# Patient Record
Sex: MALE | Race: WHITE
[De-identification: names, ages, dates, MRNs, and addresses within clinical notes are randomized per-mention and may not be internally consistent; named-entity substitution may affect disease eponyms.]

---

## 2019-02-12 ENCOUNTER — HOSPITAL ENCOUNTER (EMERGENCY)
Dept: HOSPITAL 41 - JD.ED | Age: 64
Discharge: HOME | End: 2019-02-12
Payer: COMMERCIAL

## 2019-02-12 DIAGNOSIS — R07.89: Primary | ICD-10-CM

## 2019-02-12 NOTE — EDM.PDOC
ED HPI GENERAL MEDICAL PROBLEM





- General


Chief Complaint: Chest Pain


Stated Complaint: SENT BY [facility]


Time Seen by Provider: 02/12/19 15:00


Source of Information: Reports: Patient, RN Notes Reviewed





- History of Present Illness


INITIAL COMMENTS - FREE TEXT/NARRATIVE: 





63 year old male had onset of L lateral chest discomfort about 5 days ago, a 

mild nagging ache that has not gone away.  No central chest heaviness or 

tightness.  No radiation of pain to L shoulder or arm.  He has not been short 

of breath, lightheaded or dizzy.  No personal hx of Htn, diabetes or CAD.  He 

does not smoke and denies family hx CAD as well.  


  ** Left Upper Chest


Pain Score (Numeric/FACES): 2





- Related Data


 Allergies











Allergy/AdvReac Type Severity Reaction Status Date / Time


 


No Known Allergies Allergy   Verified 02/12/19 14:59














Past Medical History


HEENT History: Reports: Impaired Vision


Other HEENT History: wears corrective lenses


Genitourinary History: Reports: Retention, Urinary





Social & Family History





- Tobacco Use


Smoking Status *Q: Never Smoker





- Caffeine Use


Caffeine Use: Reports: Coffee





- Recreational Drug Use


Recreational Drug Use: No





ED ROS GENERAL





- Review of Systems


Review Of Systems: See Below


Constitutional: Denies: Fever, Chills, Diaphoresis


HEENT: Denies: Throat Pain


Respiratory: Denies: Shortness of Breath, Pleuritic Chest Pain


Cardiovascular: Reports: Chest Pain.  Denies: Lightheadedness, Palpitations, 

Syncope


GI/Abdominal: Denies: Abdominal Pain, Nausea, Vomiting


Musculoskeletal: Denies: Shoulder Pain, Arm Pain, Back Pain


Skin: Reports: No Symptoms


Neurological: Denies: Dizziness, Numbness, Tingling, Trouble Speaking, 

Difficulty Walking, Weakness





ED EXAM, GENERAL





- Physical Exam


Exam: See Below


General Appearance: Alert, No Apparent Distress


Eye Exam: Bilateral Eye: PERRL


Nose: Normal Inspection


Throat/Mouth: Normal Inspection, Normal Oropharynx


Head: Atraumatic.  No: Facial Swelling


Neck: Supple, Full Range of Motion


Respiratory/Chest: No Respiratory Distress, Lungs Clear, Normal Breath Sounds, 

Chest Non-Tender.  No: Rhonchi, Wheezing


Cardiovascular: Regular Rate, Rhythm


GI/Abdominal: Soft, Non-Tender


Back Exam: No: CVA Tenderness (L), CVA Tenderness (R)


Extremities: Normal Inspection.  No: Pedal Edema, Leg Pain, Increased Warmth, 

Redness


Neurological: Alert, Oriented, No Motor/Sensory Deficits


Skin Exam: Warm, Dry, Normal Color





EKG INTERPRETATION


EKG Date: 02/13/19


Rhythm: Other (sinus bradycardia)


Axis: Normal


P-Wave: Present


QRS: Normal


ST-T: Normal


QT: Normal





Course





- Vital Signs


Last Recorded V/S: 


 Last Vital Signs











Temp  97.7 F   02/12/19 18:00


 


Pulse  52 L  02/12/19 18:00


 


Resp  20   02/12/19 18:00


 


BP  122/76   02/12/19 18:00


 


Pulse Ox  96   02/12/19 18:00














- Orders/Labs/Meds


Orders: 


 Active Orders 24 hr











 Category Date Time Status


 


 EKG 12 Lead [EKG Documentation Completion] [] STAT Care  02/12/19 15:15 

Active


 


 Peripheral IV Care [RC] .AS DIRECTED Care  02/12/19 15:15 Active


 


 Peripheral IV Insertion Adult [OM.PC] Stat Oth  02/12/19 15:15 Ordered











Labs: 


 Laboratory Tests











  02/12/19 02/12/19 02/12/19 Range/Units





  15:35 15:35 15:35 


 


WBC  5.20    (4.23-9.07)  K/mm3


 


RBC  4.77    (4.63-6.08)  M/mm3


 


Hgb  14.6    (13.7-17.5)  gm/L


 


Hct  43.1    (40.1-51.0)  %


 


MCV  90.4    (79.0-92.2)  fl


 


MCH  30.6    (25.7-32.2)  pg


 


MCHC  33.9    (32.2-35.5)  g/dl


 


RDW Std Deviation  42.9    (35.1-43.9)  fL


 


Plt Count  225    (163-337)  K/mm3


 


MPV  8.9 L    (9.4-12.3)  fl


 


Neut % (Auto)  45.8    (34.0-67.9)  %


 


Lymph % (Auto)  44.4    (21.8-53.1)  %


 


Mono % (Auto)  8.3    (5.3-12.2)  %


 


Eos % (Auto)  1.3    (0.8-7.0)  


 


Baso % (Auto)  0.2    (0.1-1.2)  %


 


Neut # (Auto)  2.38    (1.78-5.38)  K/mm3


 


Lymph # (Auto)  2.31    (1.32-3.57)  K/mm3


 


Mono # (Auto)  0.43    (0.30-0.82)  K/mm3


 


Eos # (Auto)  0.07    (0.04-0.54)  K/mm3


 


Baso # (Auto)  0.01    (0.01-0.08)  K/mm3


 


D-Dimer, Quantitative    0.59 H  (0.19-0.50)  mg/L


 


Sodium   142   (136-145)  mEq/L


 


Potassium   4.1   (3.5-5.1)  mEq/L


 


Chloride   104   ()  mEq/L


 


Carbon Dioxide   29   (21-32)  mEq/L


 


Anion Gap   13.1   (5-15)  


 


BUN   20 H   (7-18)  mg/dL


 


Creatinine   1.1   (0.7-1.3)  mg/dL


 


Est Cr Clr Drug Dosing   64.26   mL/min


 


Estimated GFR (MDRD)   > 60   (>60)  mL/min


 


BUN/Creatinine Ratio   18.2 H   (14-18)  


 


Glucose   112   ()  mg/dL


 


Calcium   9.2   (8.5-10.1)  mg/dL


 


Total Bilirubin   0.3   (0.2-1.0)  mg/dL


 


AST   21   (15-37)  U/L


 


ALT   37   (16-63)  U/L


 


Alkaline Phosphatase   91   ()  U/L


 


Troponin I   < 0.017   (0.00-0.056)  ng/mL


 


Total Protein   7.7   (6.4-8.2)  g/dl


 


Albumin   4.1   (3.4-5.0)  g/dl


 


Globulin   3.6   gm/dL


 


Albumin/Globulin Ratio   1.1   (1-2)  














  02/12/19 Range/Units





  17:15 


 


WBC   (4.23-9.07)  K/mm3


 


RBC   (4.63-6.08)  M/mm3


 


Hgb   (13.7-17.5)  gm/L


 


Hct   (40.1-51.0)  %


 


MCV   (79.0-92.2)  fl


 


MCH   (25.7-32.2)  pg


 


MCHC   (32.2-35.5)  g/dl


 


RDW Std Deviation   (35.1-43.9)  fL


 


Plt Count   (163-337)  K/mm3


 


MPV   (9.4-12.3)  fl


 


Neut % (Auto)   (34.0-67.9)  %


 


Lymph % (Auto)   (21.8-53.1)  %


 


Mono % (Auto)   (5.3-12.2)  %


 


Eos % (Auto)   (0.8-7.0)  


 


Baso % (Auto)   (0.1-1.2)  %


 


Neut # (Auto)   (1.78-5.38)  K/mm3


 


Lymph # (Auto)   (1.32-3.57)  K/mm3


 


Mono # (Auto)   (0.30-0.82)  K/mm3


 


Eos # (Auto)   (0.04-0.54)  K/mm3


 


Baso # (Auto)   (0.01-0.08)  K/mm3


 


D-Dimer, Quantitative   (0.19-0.50)  mg/L


 


Sodium   (136-145)  mEq/L


 


Potassium   (3.5-5.1)  mEq/L


 


Chloride   ()  mEq/L


 


Carbon Dioxide   (21-32)  mEq/L


 


Anion Gap   (5-15)  


 


BUN   (7-18)  mg/dL


 


Creatinine   (0.7-1.3)  mg/dL


 


Est Cr Clr Drug Dosing   mL/min


 


Estimated GFR (MDRD)   (>60)  mL/min


 


BUN/Creatinine Ratio   (14-18)  


 


Glucose   ()  mg/dL


 


Calcium   (8.5-10.1)  mg/dL


 


Total Bilirubin   (0.2-1.0)  mg/dL


 


AST   (15-37)  U/L


 


ALT   (16-63)  U/L


 


Alkaline Phosphatase   ()  U/L


 


Troponin I  < 0.017  (0.00-0.056)  ng/mL


 


Total Protein   (6.4-8.2)  g/dl


 


Albumin   (3.4-5.0)  g/dl


 


Globulin   gm/dL


 


Albumin/Globulin Ratio   (1-2)  











Meds: 


Medications














Discontinued Medications














Generic Name Dose Route Start Last Admin





  Trade Name Freq  PRN Reason Stop Dose Admin


 


Sodium Chloride  1,000 mls @ 999 mls/hr  02/12/19 17:30  





  Normal Saline  IV   





  ONETIME CARLITA   





     





     





     





     


 


Sodium Chloride  10 ml  02/12/19 15:15  02/12/19 15:35





  Saline Flush  FLUSH   10 ml





  ASDIRECTED PRN   Administration





  Keep Vein Open   





     





     





     














- Re-Assessments/Exams


Free Text/Narrative Re-Assessment/Exam: 





02/13/19 15:03


EKG did not show any acute changes,  initial trop and other labs normal, repeat 

trop normal.  CXR normal.  D dimer came back at 0.59 acceptable for age.  

Discharge instr. as documented.  





Departure





- Departure


Time of Disposition: 17:49


Disposition: Home, Self-Care 01


Condition: Fair


Clinical Impression: 


 Atypical chest pain





Instructions:  Nonspecific Chest Pain


Referrals: 


Sujey Holden NP [Primary Care Provider] - 


Forms:  ED Department Discharge


Additional Instructions: 


advil or ibuprofen 600 mg 3 times daily for pain and inflamation.  Follow up 

[facility] in 2 to 3 days for recheck,  Call ambulance, return to clinic or 

ED if you develop more severe chest discomfort, difficulty breathing, become 

weak dizzy with near fainting, become sweaty or diaphoretic or symptoms 

otherwise worsening in any way.  





- My Orders


Last 24 Hours: 


My Active Orders





02/12/19 15:15


EKG 12 Lead [EKG Documentation Completion] [RC] STAT 


Peripheral IV Care [RC] .AS DIRECTED 


Peripheral IV Insertion Adult [OM.PC] Stat 














- Assessment/Plan


Last 24 Hours: 


My Active Orders





02/12/19 15:15


EKG 12 Lead [EKG Documentation Completion] [RC] STAT 


Peripheral IV Care [RC] .AS DIRECTED 


Peripheral IV Insertion Adult [OM.PC] Stat